# Patient Record
Sex: MALE | Race: BLACK OR AFRICAN AMERICAN | Employment: OTHER | URBAN - METROPOLITAN AREA
[De-identification: names, ages, dates, MRNs, and addresses within clinical notes are randomized per-mention and may not be internally consistent; named-entity substitution may affect disease eponyms.]

---

## 2017-08-02 ENCOUNTER — HOSPITAL ENCOUNTER (OUTPATIENT)
Dept: PHYSICAL THERAPY | Age: 62
Discharge: HOME | End: 2017-08-02
Payer: MEDICARE

## 2017-08-02 PROCEDURE — G8979 MOBILITY GOAL STATUS: HCPCS

## 2017-08-02 PROCEDURE — 97140 MANUAL THERAPY 1/> REGIONS: CPT

## 2017-08-02 PROCEDURE — 97161 PT EVAL LOW COMPLEX 20 MIN: CPT

## 2017-08-02 PROCEDURE — G8978 MOBILITY CURRENT STATUS: HCPCS

## 2017-08-02 PROCEDURE — 97530 THERAPEUTIC ACTIVITIES: CPT

## 2017-08-02 NOTE — PROGRESS NOTES
[facility] [address], [place], [address] [facility]  Phone: [phone]      Fax:  [phone]    Plan of Care/ Statement of Necessity for Physical Therapy Services    Patient name: Jennifer Mcleod Start of Care: 2017   Referral source: Bettie Devlin MD : 1955    Medical Diagnosis: Right knee pain [M25.561]   Onset Date:17    Treatment Diagnosis: right knee pain   Prior Hospitalization: see medical history Provider#: [id number]   Medications: Verified on Patient summary List    Comorbidities: arthritis, back pain, HA, high BP,osteoporesis, sleep dysfunction   Prior Level of Function: indep with ADLs without pain      The Plan of Care and following information is based on the information from the initial evaluation. Assessment/ key information: Pt is a 62yo male s/p right TKR on 17 with c/c right knee pain that limits walking. Pt participated in [address] until 17 with pt reporting getting to 90deg knee flexion. Pt demonstrates a fair quad set with inability to complete full contraction. AROM is decreased with measuring 7-112. Right LE strength is decreased as well with being positive for HS and quad flexibility. Patellar mobility is decreased especially superiorly and inferiorly with mobility increasing slightly after mobs. Due to the above deficits pt has difficulty with complete daily activities such as stairs, walking and squatting.      Evaluation Complexity History HIGH Complexity :3+ comorbidities / personal factors will impact the outcome/ POC ; Examination LOW Complexity : 1-2 Standardized tests and measures addressing body structure, function, activity limitation and / or participation in recreation  ;Presentation LOW Complexity : Stable, uncomplicated  ;Clinical Decision Making MEDIUM Complexity : FOTO score of 26-74  Overall Complexity Rating: LOW     Problem List: pain affecting function, decrease ROM, decrease strength, edema affecting function, impaired gait/ balance, decrease ADL/ functional abilitiies, decrease activity tolerance, decrease flexibility/ joint mobility and decrease transfer abilities   Treatment Plan may include any combination of the following: Therapeutic exercise, Therapeutic activities, Neuromuscular re-education, Physical agent/modality, Gait/balance training, Manual therapy, Patient education, Self Care training, Functional mobility training, Home safety training and Stair training  Patient / Family readiness to learn indicated by: asking questions, trying to perform skills and interest  Persons(s) to be included in education: patient (P)  Barriers to Learning/Limitations: None  Patient Goal (s): Get rid of the pain and walk a lot better. t  Patient Self Reported Health Status: good  Rehabilitation Potential: good    Short Term Goals: STG- To be accomplished in 2 week(s):  1. Pt will be independent with HEP to encourage prophylaxis. Eval: HEP dispensed  Current: NA     Long Term Goals: LTG- To be accomplished in 8 week(s):  1. Pt will demonstrate ability to ambulate with LRAD in the community for >1hour in order to return to prior level of function. Eval: SPC in the community, no AD around home  Current: NA     2.  Pt will increase right knee AROM to 0-120deg in order to participate in functional activities with less pain such as biking. Eval: 7-112  Current: NA     3.  Pt right LE ROM will improve to equal that of left LE with to allow pt to return to ADLs with less pain. Eval:                                    Strength (1-5)      Left Right   Hip Flexion 5/5 4+/5     Extension 5/5 5/5     Abduction 5/5 4-/5 p!     Adduction 5/5 5/5   Knee Flexion 5/5 4/5     Extension 5/5 4/5 p! Current: NA     4. Pt FOTO score will increase by >10 points to show improvement in mobility function.   Eval:43  Current: will address at visit 5       Frequency / Duration: Patient to be seen 2 times per week for 8 weeks. Patient/ Caregiver education and instruction: Diagnosis, prognosis, self care, activity modification and exercises   [x]  Plan of care has been reviewed with PTA    G-Codes (GP)  Mobility   Current  CK= 40-59%   Goal  CK= 40-59%  The severity rating is based on clinical judgment and the FOTO score. Certification Period: 8/2/17-10/1/17   Josi Galan Remesic 8/2/2017 1:55 PM  _____________________________________________________________________  I certify that the above Therapy Services are being furnished while the patient is under my care. I agree with the treatment plan and certify that this therapy is necessary.     Physician's Signature:____________________  Date:__________Time:______    Please sign and return to   [facility] at the [address], [address] [facility]  Phone: [phone]      Fax:  [phone]

## 2017-08-02 NOTE — PROGRESS NOTES
PT DAILY TREATMENT NOTE/KNEE EVAL 3-    Patient Name: Issa Jeffrey  Date:2017  : 1955  [x]  Patient  Verified  Payor: [place] MED[place]ARE / Plan: [place] MEDICARE PART A & B / Product Type: Medicare /    In time:11:56  Out time:12:52pm  Total Treatment Time (min): 56  Total Timed Codes (min): 46  1:1 Treatment Time ( [address] only): 64   Visit #: 1 of 16    Treatment Area: Right knee pain [M25.561]    SUBJECTIVE  Pain Level (0-10 scale): 2/10 achy   [x]constant [x]intermittent [x]improving []worsening []no change since onset    Any medication changes, allergies to medications, adverse drug reactions, diagnosis change, or new procedure performed?: [x] No    [] Yes (see summary sheet for update)  Subjective functional status/changes:     PLOF: Walking with SPC prior to surgery, biking couple miles 2-3days a week, indep ADLs  Limitations to PLOF: Stairs, sleep without pain  Mechanism of Injury:  pt jumped down off landing and heard pop with lots of pain. Continued to run for training and never went to doctor about it. Has been hurting since. Pt had Right knee TKR on 17. Current symptoms/Complaints: Right knee pain with daily activities, unable to walk long distances  Previous Treatment/Compliance: HHPT until  and per pt report reached 90deg flexion  PMHx/Surgical Hx: Injured right knee , lumbar fusion pt unsure of level, arthropspci sx on both knees, bilateral hammer toes  Work Hx: Retired  Living Situation: lives with wife  Pt Goals: \"Get rid of the pain and walk a lot better. \"  Motivation: Good    OBJECTIVE/EXAMINATION    Modality rationale: decrease edema, decrease inflammation, decrease pain, increase tissue extensibility and increase muscle contraction/control to improve the patients ability to complete ADLs with less pain.     Min Type Additional Details    [] Estim:  []Unatt       []IFC  []Premod                        []Other:  []w/ice   []w/heat  Position:  Location:    [] Estim: []Att []TENS instruct  []NMES                    []Other:  []w/US   []w/ice   []w/heat  Position:  Location:    []  Traction: [] Cervical       []Lumbar                       [] Prone          []Supine                       []Intermittent   []Continuous Lbs:  [] before manual  [] after manual    []  Ultrasound: []Continuous   [] Pulsed                           []1MHz   []3MHz Location:  W/cm2:    []  Iontophoresis with dexamethasone         Location: [] Take home patch   [] In clinic    []  Ice     []  heat  []  Ice massage  []  Laser   []  Anodyne Position:  Location:    []  Laser with stim  []  Other: Position:  Location:   10 [x]  Vasopneumatic Device Pressure:       [] lo [x] med [] hi   Temperature: [] lo [x] med [] hi   [x] Skin assessment post-treatment:  [x]intact []redness- no adverse reaction    []redness  adverse reaction:     20 min [x]Eval                  []Re-Eval       18 min Therapeutic Activity:  []  See flow sheet : Pt education on exam findings, POC, HEP compliance, self scar massage. Dispensed HEP. Rationale: increase ROM, increase strength, improve coordination, improve balance and increase proprioception  to improve the patients ability to complete daily activities with less pain. 8 min Manual Therapy:  Grade II-III Patellar mobs, PROM    Rationale: decrease pain, increase ROM, increase tissue extensibility and decrease trigger points to allow pt to participate in ADLs with less pain.            With   [] TE   [x] TA   [] neuro   [] other: Patient Education: [x] Review HEP    [] Progressed/Changed HEP based on:   [] positioning   [] body mechanics   [] transfers   [] heat/ice application    [] other:      Other Objective/Functional Measures: Dispensed HEP, See POC    Physical Therapy Evaluation - Knee    Gait:  [] Normal    [] Abnormal    [] Antalgic    [] NWB    Device: SPC    Describe: WBAT right LE, dec abran, inc right knee flexion in stance  ROM / Strength  [] Unable to assess AROM                      PROM                   Strength (1-5)    Left Right Left Right Left Right   Hip Flexion     5/5 4+/5    Extension     5/5 5/5    Abduction     5/5 4-/5 p! Adduction     5/5 5/5   Knee Flexion 125 112  115 5/5 4/5    Extension 0 -7  -5 5/5 4/5 p! Flexibility: [] Unable to assess at this time  Hamstrings:    (L) Tightness= [] WNL   [] Min   [] Mod   [] Severe    (R) Tightness= [] WNL   [] Min   [x] Mod   [] Severe  Quadriceps:    (L) Tightness= [] WNL   [] Min   [] Mod   [] Severe    (R) Tightness= [] WNL   [] Min   [x] Mod   [] Severe  Gastroc:      (L) Tightness= [] WNL   [] Min   [] Mod   [] Severe    (R) Tightness= [] WNL   [] Min   [x] Mod   [] Severe  Other:    Palpation:   Neg/Pos  Neg/Pos  Neg/Pos   Joint Line  Quad tendon  Patellar ligament    Patella pos Fibular head  Pes Anserinus    Tibial tubercle  Hamstring tendons pos Infrapatellar fat pad      Optional Tests:  Patellar Mobility   []L []R Hypermobile []L [x]R Hypomobile  Sup/Inf     Other tests/comments:   Quad set - fair  Ely's - positive right       Pain Level (0-10 scale) post treatment: 2    ASSESSMENT/Changes in Function: Pt is a 64yo male s/p right TKR on 7/11/17 with c/c right knee pain that limits walking. Pt participated in [address] until 7/28/17 with pt reporting getting to 90deg knee flexion. Pt demonstrates a fair quad set with inability to complete full contraction. AROM is decreased with measuring 7-112. Right LE strength is decreased as well with being positive for HS and quad flexibility. Patellar mobility is decreased especially superiorly and inferiorly with mobility increasing slightly after mobs. Due to the above deficits pt has difficulty with complete daily activities such as stairs, walking and squatting.      Patient will continue to benefit from skilled PT services to modify and progress therapeutic interventions, address functional mobility deficits, address ROM deficits, address strength deficits, analyze and address soft tissue restrictions, analyze and cue movement patterns, analyze and modify body mechanics/ergonomics, assess and modify postural abnormalities, address imbalance/dizziness and instruct in home and community integration to attain remaining goals. []  See Plan of Care  []  See progress note/recertification  []  See Discharge Summary         Progress towards goals / Updated goals:  Short Term Goals: STG- To be accomplished in 2 week(s):  1. Pt will be independent with HEP to encourage prophylaxis. Eval: HEP dispensed  Current: NA    Long Term Goals: LTG- To be accomplished in 8 week(s):  1. Pt will demonstrate ability to ambulate with LRAD in the community for >1hour in order to return to prior level of function. Eval: SPC in the community, no AD around home  Current: NA    2. Pt will increase right knee AROM to 0-120deg in order to participate in functional activities with less pain such as biking. Eval: 7-112  Current: NA    3. Pt right LE ROM will improve to equal that of left LE with to allow pt to return to ADLs with less pain. Eval:                                    Strength (1-5)    Left Right   Hip Flexion 5/5 4+/5    Extension 5/5 5/5    Abduction 5/5 4-/5 p! Adduction 5/5 5/5   Knee Flexion 5/5 4/5    Extension 5/5 4/5 p! Current: NA    4. Pt FOTO score will increase by >10 points to show improvement in mobility function.   Eval:43  Current: will address at visit 5      PLAN  []  Upgrade activities as tolerated     []  Continue plan of care  []  Update interventions per flow sheet       []  Discharge due to:_  []  Other:_      Cuoc Call 8/2/2017  11:55 AM

## 2017-08-07 ENCOUNTER — HOSPITAL ENCOUNTER (OUTPATIENT)
Dept: PHYSICAL THERAPY | Age: 62
Discharge: HOME | End: 2017-08-07
Payer: MEDICARE

## 2017-08-07 PROCEDURE — 97140 MANUAL THERAPY 1/> REGIONS: CPT | Performed by: PHYSICAL THERAPIST

## 2017-08-07 PROCEDURE — 97110 THERAPEUTIC EXERCISES: CPT | Performed by: PHYSICAL THERAPIST

## 2017-08-07 NOTE — PROGRESS NOTES
PT DAILY TREATMENT NOTE - [facility]     Patient Name: Arnel Mckenzie  Date:2017  : 1955  [x]  Patient  Verified  Payor: Herbert Radames / Plan: [place] MEDICARE PART A & B / Product Type: Medicare /    In time:4:30  Out time:5:35  Total Treatment Time (min): 65  Total Timed Codes (min): 53  1:1 Treatment Time ( [address] only): 40  Visit #: 2 of 16    Treatment Area: Right knee pain [M25.561]    SUBJECTIVE  Pain Level (0-10 scale): 1  Any medication changes, allergies to medications, adverse drug reactions, diagnosis change, or new procedure performed?: [x] No    [] Yes (see summary sheet for update)  Subjective functional status/changes:   [] No changes reported  \"I am feeling pretty good today. \"    OBJECTIVE    Modality rationale: decrease edema, decrease inflammation and decrease pain to improve the patients ability to perform daily activities with decreased pain and symptom levels     Min Type Additional Details    [] Estim:  []Unatt       []IFC  []Premod                        []Other:  []w/ice   []w/heat  Position:  Location:    [] Estim: []Att    []TENS instruct  []NMES                    []Other:  []w/US   []w/ice   []w/heat  Position:  Location:    []  Traction: [] Cervical       []Lumbar                       [] Prone          []Supine                       []Intermittent   []Continuous Lbs:  [] before manual  [] after manual    []  Ultrasound: []Continuous   [] Pulsed                           []1MHz   []3MHz W/cm2:  Location:    []  Iontophoresis with dexamethasone         Location: [] Take home patch   [] In clinic    []  Ice     []  heat  []  Ice massage  []  Laser   []  Anodyne Position:  Location:    []  Laser with stim  []  Other:  Position:  Location:   10 [x]  Vasopneumatic Device Pressure:       [] lo [] med [] hi   Temperature: [] lo [] med [] hi   [] Skin assessment post-treatment:  [x]intact [x]redness- no adverse reaction    []redness  adverse reaction:     35 min Therapeutic Exercise:  [x] See flow sheet :   Rationale: increase ROM, increase strength and improve coordination to improve the patients ability to perform daily activities with decreased pain and symptom levels      10 min Manual Therapy:  PROM, joint mobs to right knee   Rationale: decrease pain, increase ROM, increase tissue extensibility and decrease trigger points to perform daily activities with decreased pain and symptom levels              With   [x] TE   [] TA   [] neuro   [] other: Patient Education: [x] Review HEP    [] Progressed/Changed HEP based on:   [] positioning   [] body mechanics   [] transfers   [] heat/ice application    [] other:      Other Objective/Functional Measures: PROM: 3-120     Pain Level (0-10 scale) post treatment: 1/10    ASSESSMENT/Changes in Function: Patient demonstrates weakness in right hip as evidenced by trendelenburg and pelvic drop during step ups. Patient will continue to benefit from skilled PT services to modify and progress therapeutic interventions, address functional mobility deficits, address ROM deficits, address strength deficits, analyze and address soft tissue restrictions, analyze and cue movement patterns, assess and modify postural abnormalities and instruct in home and community integration to attain remaining goals. []  See Plan of Care  []  See progress note/recertification  []  See Discharge Summary         Progress towards goals / Updated goals:  Short Term Goals: STG- To be accomplished in 2 week(s):  1.  Pt will be independent with HEP to encourage prophylaxis. Eval: HEP dispensed  Current: NA      Long Term Goals: LTG- To be accomplished in 8 week(s):  1.  Pt will demonstrate ability to ambulate with LRAD in the community for >1hour in order to return to prior level of function.   Eval: SPC in the community, no AD around home  Current: NA      2.  Pt will increase right knee AROM to 0-120deg in order to participate in functional activities with less pain such as biking. Eval: 7-112  Current: NA      3.  Pt right LE ROM will improve to equal that of left LE with to allow pt to return to ADLs with less pain. Eval:                                    Strength (1-5)        Left Right   Hip Flexion 5/5 4+/5      Extension 5/5 5/5      Abduction 5/5 4-/5 p!      Adduction 5/5 5/5   Knee Flexion 5/5 4/5      Extension 5/5 4/5 p! Current: NA      4.  Pt FOTO score will increase by >10 points to show improvement in mobility function.   Eval:43  Current: will address at visit 5      PLAN  [x]  Upgrade activities as tolerated     [x]  Continue plan of care  []  Update interventions per flow sheet       []  Discharge due to:_  []  Other:_      Layton Olivares PT 8/7/2017  7:16 PM    Future Appointments  Date Time Provider [place]   8/9/2017 4:00 PM [facility] PT LE[facility]VA 2 [facility]   8/14/2017 4:30 PM Layton Olivares PT [facility]   8/16/2017 4:00 PM [facility] PT [facility] 2 [facility]   8/21/2017 11:30 AM [facility] PT [facility] 2 [facility]   8/23/2017 4:00 PM [facility] PT [facility] 2 [facility]   8/28/2017 3:30 PM Layton Olivares PT [facility]   8/30/2017 4:00 PM [facility] PT [facility] 2 [facility]

## 2017-08-09 ENCOUNTER — HOSPITAL ENCOUNTER (OUTPATIENT)
Dept: PHYSICAL THERAPY | Age: 62
Discharge: HOME | End: 2017-08-09
Payer: MEDICARE

## 2017-08-09 PROCEDURE — 97140 MANUAL THERAPY 1/> REGIONS: CPT

## 2017-08-09 PROCEDURE — 97110 THERAPEUTIC EXERCISES: CPT

## 2017-08-09 PROCEDURE — 97016 VASOPNEUMATIC DEVICE THERAPY: CPT

## 2017-08-09 NOTE — PROGRESS NOTES
PT DAILY TREATMENT NOTE - [facility]     Patient Name: Daya Singleton  Date:2017  : 1955  [x]  Patient  Verified  Payor: Kyle Fraction / Plan: [place] MEDICARE PART A & B / Product Type: Medicare /    In time:4:00  Out time:5:08pm  Total Treatment Time (min): 68  Total Timed Codes (min): 58  1:1 Treatment Time ( [address] only): 40   Visit #: 3 of 16    Treatment Area: Right knee pain [M25.561]    SUBJECTIVE  Pain Level (0-10 scale): 3  Any medication changes, allergies to medications, adverse drug reactions, diagnosis change, or new procedure performed?: [x] No    [] Yes (see summary sheet for update)  Subjective functional status/changes:   [] No changes reported  \"My knee is hurting a little more today but not much. \"    OBJECTIVE    Modality rationale: decrease edema, decrease inflammation, decrease pain, increase tissue extensibility and increase muscle contraction/control to improve the patients ability to perform ADLs with decreased pain.     Min Type Additional Details    [] Estim:  []Unatt       []IFC  []Premod                        []Other:  []w/ice   []w/heat  Position:  Location:    [] Estim: []Att    []TENS instruct  []NMES                    []Other:  []w/US   []w/ice   []w/heat  Position:  Location:    []  Traction: [] Cervical       []Lumbar                       [] Prone          []Supine                       []Intermittent   []Continuous Lbs:  [] before manual  [] after manual    []  Ultrasound: []Continuous   [] Pulsed                           []1MHz   []3MHz W/cm2:  Location:    []  Iontophoresis with dexamethasone         Location: [] Take home patch   [] In clinic    []  Ice     []  heat  []  Ice massage  []  Laser   []  Anodyne Position:  Location:    []  Laser with stim  []  Other:  Position:  Location:   10 [x]  Vasopneumatic Device Pressure:       [] lo [x] med [] hi   Temperature: [] lo [x] med [] hi   [x] Skin assessment post-treatment:  [x]intact []redness- no adverse reaction []redness  adverse reaction:       48 min Therapeutic Exercise:  [x] See flow sheet :   Rationale: increase ROM, increase strength, improve coordination, improve balance and increase proprioception to improve the patients ability to perform ADLs with decreased pain. 10 min Manual Therapy:  PROM to right knee, mob into extension/ER, patellar mobs   Rationale: decrease pain, increase ROM and increase tissue extensibility to perform ADLs with decreased pain. With   [] TE   [] TA   [] neuro   [] other: Patient Education: [x] Review HEP    [] Progressed/Changed HEP based on:   [] positioning   [] body mechanics   [] transfers   [] heat/ice application    [] other:      Other Objective/Functional Measures: Increased right knee AROM 2-116*     Pain Level (0-10 scale) post treatment: 3-4    ASSESSMENT/Changes in Function: Quad set is improving to good contraction but fatigues easily. Right knee AROM continues to improve with increased flexion and extension today. Patient will continue to benefit from skilled PT services to modify and progress therapeutic interventions, address functional mobility deficits, address ROM deficits, address strength deficits, analyze and address soft tissue restrictions, analyze and cue movement patterns, analyze and modify body mechanics/ergonomics, address imbalance/dizziness and instruct in home and community integration to attain remaining goals. [x]  See Plan of Care  []  See progress note/recertification  []  See Discharge Summary         Progress towards goals / Updated goals:  Short Term Goals: STG- To be accomplished in 2 week(s):  1.  Pt will be independent with HEP to encourage prophylaxis. Eval: HEP dispensed  Current: Partial compliance per pt report      Long Term Goals: LTG- To be accomplished in 8 week(s):  1.  Pt will demonstrate ability to ambulate with LRAD in the community for >1hour in order to return to prior level of function.   Eval: SPC in the community, no AD around home  Current: NA      2.  Pt will increase right knee AROM to 0-120deg in order to participate in functional activities with less pain such as biking. Eval: 7-112  Current: 2-116*      3.  Pt right LE ROM will improve to equal that of left LE with to allow pt to return to ADLs with less pain. Eval:                                    Strength (1-5)        Left Right   Hip Flexion 5/5 4+/5      Extension 5/5 5/5      Abduction 5/5 4-/5 p!      Adduction 5/5 5/5   Knee Flexion 5/5 4/5      Extension 5/5 4/5 p! Current: NA      4.  Pt FOTO score will increase by >10 points to show improvement in mobility function.   Eval:43  Current: will address at visit 5       PLAN  [x]  Upgrade activities as tolerated     [x]  Continue plan of care  []  Update interventions per flow sheet       []  Discharge due to:_  []  Other:_      Bello [facility] 8/9/2017  4:26 PM    Future Appointments  Date Time Provider [place]   8/14/2017 4:30 PM Sujit Donnelly PT [facility]   8/16/2017 4:00 [facility]   8/21/2017 11:30 AM [facility]   8/23/2017 4:00 PM [facility] PT LEYVA 2 [facility]   8/28/2017 3:30 PM Sujit Donnelly PT [facility]   8/30/2017 4:00 PM [facility] PT LEYVA 2 [facility]

## 2017-08-14 ENCOUNTER — HOSPITAL ENCOUNTER (OUTPATIENT)
Dept: PHYSICAL THERAPY | Age: 62
Discharge: HOME | End: 2017-08-14
Payer: MEDICARE

## 2017-08-14 PROCEDURE — 97140 MANUAL THERAPY 1/> REGIONS: CPT | Performed by: PHYSICAL THERAPIST

## 2017-08-14 PROCEDURE — 97110 THERAPEUTIC EXERCISES: CPT | Performed by: PHYSICAL THERAPIST

## 2017-08-14 PROCEDURE — 97530 THERAPEUTIC ACTIVITIES: CPT | Performed by: PHYSICAL THERAPIST

## 2017-08-14 NOTE — PROGRESS NOTES
PT DAILY TREATMENT NOTE - [facility]     Patient Name: Randy Heart  Date:2017  : 1955  [x]  Patient  Verified  Payor: [place] MED[place]ARE / Plan: [place] MEDICARE PART A & B / Product Type: Medicare /    In time:4:30  Out time:5:38  Total Treatment Time (min): 68  Total Timed Codes (min): 68  1:1 Treatment Time ( [address] only): 68   Visit #: 4  16    Treatment Area: Right knee pain [M25.561]    SUBJECTIVE  Pain Level (0-10 scale): 410  Any medication changes, allergies to medications, adverse drug reactions, diagnosis change, or new procedure performed?: [x] No    [] Yes (see summary sheet for update)  Subjective functional status/changes:   [] No changes reported  \"I am feeling a little stiff today. \"    OBJECTIVE    Modality rationale: decrease inflammation, decrease pain and increase tissue extensibility to improve the patients ability to perform daily activities with decreased pain and symptom levels     Min Type Additional Details    [] Estim:  []Unatt       []IFC  []Premod                        []Other:  []w/ice   []w/heat  Position:  Location:    [] Estim: []Att    []TENS instruct  []NMES                    []Other:  []w/US   []w/ice   []w/heat  Position:  Location:    []  Traction: [] Cervical       []Lumbar                       [] Prone          []Supine                       []Intermittent   []Continuous Lbs:  [] before manual  [] after manual    []  Ultrasound: []Continuous   [] Pulsed                           []1MHz   []3MHz W/cm2:  Location:    []  Iontophoresis with dexamethasone         Location: [] Take home patch   [] In clinic    []  Ice     []  heat  []  Ice massage  []  Laser   []  Anodyne Position:  Location:    []  Laser with stim  []  Other:  Position:  Location:   10 [x]  Vasopneumatic Device Pressure:       [] lo [] med [] hi   Temperature: [] lo [] med [] hi   [] Skin assessment post-treatment:  [x]intact [x]redness- no adverse reaction    []redness  adverse reaction:       34 min Therapeutic Exercise:  [x] See flow sheet :   Rationale: increase ROM, increase strength and improve coordination to improve the patients ability to perform daily activities with decreased pain and symptom levels      14 min Therapeutic Activity:  [x]  See flow sheet :   Rationale: increase ROM, increase strength and improve coordination  to improve the patients ability to perform daily activities with decreased pain and symptom levels     10 min Manual Therapy:  PROM to right knee, tibiofemoral joint mobs, inferior patellar glide in flexion. Rationale: decrease pain, increase ROM, increase tissue extensibility and decrease trigger points to perform daily activities with decreased pain and symptom levels              With   [x] TE   [] TA   [] neuro   [] other: Patient Education: [x] Review HEP    [x] Progressed/Changed HEP based on:   [] positioning   [] body mechanics   [] transfers   [] heat/ice application    [] other:      Other Objective/Functional Measures:   PROM: 0-124  AROM: 0-120     Pain Level (0-10 scale) post treatment: 3/10    ASSESSMENT/Changes in Function: Patient demonstrates excellent ROM s/p right TKR and with good quad control observed. Patient will continue to benefit from skilled PT services to modify and progress therapeutic interventions, address functional mobility deficits, address ROM deficits, address strength deficits, analyze and address soft tissue restrictions, analyze and cue movement patterns and analyze and modify body mechanics/ergonomics to attain remaining goals. []  See Plan of Care  []  See progress note/recertification  []  See Discharge Summary         Progress towards goals / Updated goals:  Short Term Goals: STG- To be accomplished in 2 week(s):  1.  Pt will be independent with HEP to encourage prophylaxis.   Eval: HEP dispensed  Current: Partial compliance per pt report      Long Term Goals: LTG- To be accomplished in 8 week(s):  1.  Pt will demonstrate ability to ambulate with LRAD in the community for >1hour in order to return to prior level of function. Eval: SPC in the community, no AD around home  Current: NA      2.  Pt will increase right knee AROM to 0-120deg in order to participate in functional activities with less pain such as biking. Eval: 7-112  Current: 0-120- MET      3.  Pt right LE ROM will improve to equal that of left LE with to allow pt to return to ADLs with less pain. Eval:                                    Strength (1-5)        Left Right   Hip Flexion 5/5 4+/5      Extension 5/5 5/5      Abduction 5/5 4-/5 p!      Adduction 5/5 5/5   Knee Flexion 5/5 4/5      Extension 5/5 4/5 p! Current: NA      4.  Pt FOTO score will increase by >10 points to show improvement in mobility function.   Eval:43  Current: will address at visit 5      PLAN  []  Upgrade activities as tolerated     []  Continue plan of care  []  Update interventions per flow sheet       []  Discharge due to:_  []  Other:_      Hung Joseph PT 8/14/2017  4:39 PM    Future Appointments  Date Time Provider Filemon Hopkins   8/16/2017 4:00 PM THE [facility] PT LEYVA 2 [facility]   8/21/2017 11:30 AM THE [facility] PT LEYVA 2 [facility]   8/23/2017 4:00 PM THE [facility] PT LEYVA 2 [facility]PTBW [facility]   8/28/2017 3:30 PM ALVA BenoitHPSUSSY THE [facility]   8/30/2017 2:30 PM ALVA Benoit THE [facility]

## 2017-08-16 ENCOUNTER — HOSPITAL ENCOUNTER (OUTPATIENT)
Dept: PHYSICAL THERAPY | Age: 62
Discharge: HOME | End: 2017-08-16
Payer: MEDICARE

## 2017-08-16 PROCEDURE — 97016 VASOPNEUMATIC DEVICE THERAPY: CPT

## 2017-08-16 PROCEDURE — 97140 MANUAL THERAPY 1/> REGIONS: CPT

## 2017-08-16 PROCEDURE — 97110 THERAPEUTIC EXERCISES: CPT

## 2017-08-16 NOTE — PROGRESS NOTES
PT DAILY TREATMENT NOTE - [facility]     Patient Name: Yumiko Landa  Date:2017  : 1955  [x]  Patient  Verified  Payor: [place] MEDICARE / Plan: [place] MEDICARE PART A & B / Product Type: Medicare /    In time:4:23  Out time:5:40  Total Treatment Time (min): 77  Total Timed Codes (min): 67  1:1 Treatment Time ( [address] only): 39   Visit #: 5 of 16    Treatment Area: Right knee pain [M25.561]    SUBJECTIVE  Pain Level (0-10 scale): 3  Any medication changes, allergies to medications, adverse drug reactions, diagnosis change, or new procedure performed?: [x] No    [] Yes (see summary sheet for update)  Subjective functional status/changes:   [] No changes reported  \"Still having to use my cane but I feel like my balance is getting better. \"     OBJECTIVE    Modality rationale: decrease inflammation, decrease pain, increase tissue extensibility and increase muscle contraction/control to improve the patients ability to perform ADLs with decreased pain.     Min Type Additional Details    [] Estim:  []Unatt       []IFC  []Premod                        []Other:  []w/ice   []w/heat  Position:  Location:    [] Estim: []Att    []TENS instruct  []NMES                    []Other:  []w/US   []w/ice   []w/heat  Position:  Location:    []  Traction: [] Cervical       []Lumbar                       [] Prone          []Supine                       []Intermittent   []Continuous Lbs:  [] before manual  [] after manual    []  Ultrasound: []Continuous   [] Pulsed                           []1MHz   []3MHz W/cm2:  Location:    []  Iontophoresis with dexamethasone         Location: [] Take home patch   [] In clinic    []  Ice     []  heat  []  Ice massage  []  Laser   []  Anodyne Position:  Location:    []  Laser with stim  []  Other:  Position:  Location:   10 [x]  Vasopneumatic Device Pressure:       [] lo [x] med [] hi   Temperature: [] lo [x] med [] hi   [x] Skin assessment post-treatment:  [x]intact []redness- no adverse reaction []redness  adverse reaction:       37 min Therapeutic Exercise:  [x] See flow sheet :   Rationale: increase ROM, increase strength and improve coordination to improve the patients ability to perform daily activities with decreased pain. 12 min Therapeutic Activity:  [x]  See flow sheet :   Rationale: increase ROM, increase strength, improve coordination and improve balance  to improve the patients ability to perform daily activities with decreased pain and symptom levels. 8 min Manual Therapy:  PROM to right knee, post tibiofemoral joint mobs, inf patella glide   Rationale: decrease pain, increase ROM and increase tissue extensibility to perform daily activities with decreased pain. With   [] TE   [] TA   [] neuro   [] other: Patient Education: [x] Review HEP    [] Progressed/Changed HEP based on:   [] positioning   [] body mechanics   [] transfers   [] heat/ice application    [] other:      Other Objective/Functional Measures: Improved patella mobility and quad set, good almost strong quad set     Pain Level (0-10 scale) post treatment: 3    ASSESSMENT/Changes in Function: Pt right knee AROM is improving with reaching 123* today. Continues to demonstrate glut fatigue with 3 way hip. Patient will continue to benefit from skilled PT services to modify and progress therapeutic interventions, address functional mobility deficits, address ROM deficits, address strength deficits, analyze and cue movement patterns, address imbalance/dizziness and instruct in home and community integration to attain remaining goals. []  See Plan of Care  []  See progress note/recertification  []  See Discharge Summary         Progress towards goals / Updated goals:  Short Term Goals: STG- To be accomplished in 2 week(s):  1.  Pt will be independent with HEP to encourage prophylaxis.   Eval: HEP dispensed  Current: Partial compliance per pt report      Long Term Goals: LTG- To be accomplished in 8 week(s):  1.  Pt will demonstrate ability to ambulate with LRAD in the community for >1hour in order to return to prior level of function. Eval: SPC in the community, no AD around home  Current: Still needing to use SPC in community however reports balance seems to be getting better - progressing      2.  Pt will increase right knee AROM to 0-120deg in order to participate in functional activities with less pain such as biking. Eval: 7-112  Current: 0-123- MET      3.  Pt right LE ROM will improve to equal that of left LE with to allow pt to return to ADLs with less pain. Eval:                                    Strength (1-5)        Left Right   Hip Flexion 5/5 4+/5      Extension 5/5 5/5      Abduction 5/5 4-/5 p!      Adduction 5/5 5/5   Knee Flexion 5/5 4/5      Extension 5/5 4/5 p! Current: NA      4.  Pt FOTO score will increase by >10 points to show improvement in mobility function.   Eval:43  Current: will evaluate next visit          PLAN  [x]  Upgrade activities as tolerated     [x]  Continue plan of care  []  Update interventions per flow sheet       []  Discharge due to:_  []  Other:_      Blair Trevino 8/16/2017  4:26 PM    Future Appointments  Date Time Provider Filemon Hopkins   8/21/2017 11:30 AM [facility] PT LEYVA 2 [facility]   8/23/2017 4:00 PM [facility] PT LEYVA 2 [facility]   8/28/2017 3:30 PM Suhas Garcia PT [facility]   8/30/2017 2:30 PM Suhas Garcia PT [facility]

## 2017-08-21 ENCOUNTER — APPOINTMENT (OUTPATIENT)
Dept: PHYSICAL THERAPY | Age: 62
End: 2017-08-21
Payer: MEDICARE

## 2017-08-23 ENCOUNTER — APPOINTMENT (OUTPATIENT)
Dept: PHYSICAL THERAPY | Age: 62
End: 2017-08-23
Payer: MEDICARE

## 2017-08-23 ENCOUNTER — HOSPITAL ENCOUNTER (OUTPATIENT)
Dept: PHYSICAL THERAPY | Age: 62
Discharge: HOME | End: 2017-08-23
Payer: MEDICARE

## 2017-08-23 PROCEDURE — 97110 THERAPEUTIC EXERCISES: CPT

## 2017-08-23 PROCEDURE — 97140 MANUAL THERAPY 1/> REGIONS: CPT

## 2017-08-23 PROCEDURE — 97530 THERAPEUTIC ACTIVITIES: CPT

## 2017-08-23 NOTE — PROGRESS NOTES
PT DAILY TREATMENT NOTE - [facility]     Patient Name: Randy Heart  Date:2017  : 1955  [x]  Patient  Verified  Payor: Brooke [employer] / Plan: [place] MEDICARE PART A & B / Product Type: Medicare /    In time:3:00  Out time:4:20  Total Treatment Time (min): 80  Total Timed Codes (min): 70  1:1 Treatment Time ( [address] only): 39   Visit #: 6 of 16    Treatment Area: Right knee pain [M25.561]    SUBJECTIVE  Pain Level (0-10 scale): 3  Any medication changes, allergies to medications, adverse drug reactions, diagnosis change, or new procedure performed?: [x] No    [] Yes (see summary sheet for update)  Subjective functional status/changes:   [] No changes reported  [facility] doctor said I am doing good. My knee is still painful though. \"    OBJECTIVE    Modality rationale: decrease inflammation, decrease pain and increase muscle contraction/control to improve the patients ability to perform daily activities with decreased pain.     Min Type Additional Details    [] Estim:  []Unatt       []IFC  []Premod                        []Other:  []w/ice   []w/heat  Position:  Location:    [] Estim: []Att    []TENS instruct  []NMES                    []Other:  []w/US   []w/ice   []w/heat  Position:  Location:    []  Traction: [] Cervical       []Lumbar                       [] Prone          []Supine                       []Intermittent   []Continuous Lbs:  [] before manual  [] after manual    []  Ultrasound: []Continuous   [] Pulsed                           []1MHz   []3MHz W/cm2:  Location:    []  Iontophoresis with dexamethasone         Location: [] Take home patch   [] In clinic    []  Ice     []  heat  []  Ice massage  []  Laser   []  Anodyne Position:  Location:    []  Laser with stim  []  Other:  Position:  Location:   10 [x]  Vasopneumatic Device Pressure:       [] lo [x] med [] hi   Temperature: [] lo [x] med [] hi   [x] Skin assessment post-treatment:  []intact []redness- no adverse reaction    []redness  adverse reaction: 58 min Therapeutic Exercise:  [x] See flow sheet :   Rationale: increase ROM, increase strength and improve coordination to improve the patients ability to perform daily activities with decreased pain. 12 min Therapeutic Activity:  [x]  See flow sheet :   Rationale: increase ROM, increase strength, improve balance and increase proprioception  to improve the patients ability to perform daily activities with decreased pain and symptom levels. 10 min Manual Therapy:  PROM right knee, STM to lateral HS   Rationale: decrease pain, increase ROM, increase tissue extensibility and decrease trigger points to perform daily activities with decreased pain. With   [] TE   [] TA   [] neuro   [] other: Patient Education: [x] Review HEP    [] Progressed/Changed HEP based on:   [] positioning   [] body mechanics   [] transfers   [] heat/ice application    [] other:      Other Objective/Functional Measures: Continues to demonstrate fair quad set, tightness in lateral HS noted     Pain Level (0-10 scale) post treatment: 3    ASSESSMENT/Changes in Function: Pt continues to demonstrate fair to good quad set due to quad inhibition from swelling. AROM is improving with reaching 120* continuously. Glut fatigue noted with 3 way hip and clamshells. Added hurdles today to improve balance. Patient will continue to benefit from skilled PT services to modify and progress therapeutic interventions, address functional mobility deficits, address strength deficits, analyze and address soft tissue restrictions, analyze and cue movement patterns, address imbalance/dizziness and instruct in home and community integration to attain remaining goals. []  See Plan of Care  []  See progress note/recertification  []  See Discharge Summary         Progress towards goals / Updated goals:  Short Term Goals: STG- To be accomplished in 2 week(s):  1.  Pt will be independent with HEP to encourage prophylaxis.   Eval: HEP dispensed  Current: Partial compliance per pt report      Long Term Goals: LTG- To be accomplished in 8 week(s):  1.  Pt will demonstrate ability to ambulate with LRAD in the community for >1hour in order to return to prior level of function. Eval: SPC in the community, no AD around home  Current: Still needing to use SPC in community however reports balance seems to be getting better - progressing      2.  Pt will increase right knee AROM to 0-120deg in order to participate in functional activities with less pain such as biking. Eval: 7-112  Current: 0-123- MET      3.  Pt right LE ROM will improve to equal that of left LE with to allow pt to return to ADLs with less pain. Eval:                                    Strength (1-5)        Left Right   Hip Flexion 5/5 4+/5      Extension 5/5 5/5      Abduction 5/5 4-/5 p!      Adduction 5/5 5/5   Knee Flexion 5/5 4/5      Extension 5/5 4/5 p! Current: NA      4.  Pt FOTO score will increase by >10 points to show improvement in mobility function.   Eval:43  Current: 40 - progressing        PLAN  [x]  Upgrade activities as tolerated     [x]  Continue plan of care  []  Update interventions per flow sheet       []  Discharge due to:_  []  Other:_      Arlet Michelle 8/23/2017  3:03 PM    Future Appointments  Date Time Provider Filemon Hopkins   8/23/2017 4:00 PM THE [facility] PT LEYVA 2 [facility]PTBW [facility]   8/28/2017 3:30 PM ALVA MccarthyHPSUSSY [facility]   8/30/2017 2:30 PM Garcia Bush PT [facility]

## 2017-08-28 ENCOUNTER — HOSPITAL ENCOUNTER (OUTPATIENT)
Dept: PHYSICAL THERAPY | Age: 62
Discharge: HOME | End: 2017-08-28
Payer: MEDICARE

## 2017-08-28 PROCEDURE — 97110 THERAPEUTIC EXERCISES: CPT | Performed by: PHYSICAL THERAPIST

## 2017-08-28 PROCEDURE — 97140 MANUAL THERAPY 1/> REGIONS: CPT | Performed by: PHYSICAL THERAPIST

## 2017-08-28 PROCEDURE — 97530 THERAPEUTIC ACTIVITIES: CPT | Performed by: PHYSICAL THERAPIST

## 2017-08-28 NOTE — PROGRESS NOTES
PT DAILY TREATMENT NOTE - [facility]      Patient Name: Vidhi Pineda  Date:2017  : 1955  [x]  Patient  Verified  Payor: Iris [place] / Plan: [place] MEDICARE PART A & B / Product Type: Medicare /    In time:3:30  Out time:4:35  Total Treatment Time (min): 65  Total Timed Codes (min): 55  1:1 Treatment Time ( [address] only): 55  Visit #: 7 of 16     Treatment Area: Right knee pain [M25.561]     SUBJECTIVE  Pain Level (0-10 scale): 2  Any medication changes, allergies to medications, adverse drug reactions, diagnosis change, or new procedure performed?: [x] No    [] Yes (see summary sheet for update)  Subjective functional status/changes:   [] No changes reported  \"Just a little bit stiff today; overall doing fine. \"     OBJECTIVE           Modality rationale: decrease inflammation, decrease pain and increase muscle contraction/control to improve the patients ability to perform daily activities with decreased pain.     Min Type Additional Details     [] Estim:  []Unatt       []IFC  []Premod                        []Other:  []w/ice   []w/heat  Position:  Location:     [] Estim: []Att    []TENS instruct  []NMES                    []Other:  []w/US   []w/ice   []w/heat  Position:  Location:     []  Traction: [] Cervical       []Lumbar                       [] Prone          []Supine                       []Intermittent   []Continuous Lbs:  [] before manual  [] after manual     []  Ultrasound: []Continuous   [] Pulsed                           []1MHz   []3MHz W/cm2:  Location:     []  Iontophoresis with dexamethasone         Location: [] Take home patch   [] In clinic     []  Ice     []  heat  []  Ice massage  []  Laser   []  Anodyne Position:  Location:     []  Laser with stim  []  Other:  Position:  Location:   10 [x]  Vasopneumatic Device Pressure:       [] lo [x] med [] hi   Temperature: [] lo [x] med [] hi   [x] Skin assessment post-treatment:  []intact []redness- no adverse reaction    []redness  adverse reaction:         36 min Therapeutic Exercise:  [x] See flow sheet :   Rationale: increase ROM, increase strength and improve coordination to improve the patients ability to perform daily activities with decreased pain.     10 min Therapeutic Activity:  [x]  See flow sheet :   Rationale: increase ROM, increase strength, improve balance and increase proprioception  to improve the patients ability to perform daily activities with decreased pain and symptom levels.    19 min Manual Therapy:  PROM right knee, STM to lateral HS   Rationale: decrease pain, increase ROM, increase tissue extensibility and decrease trigger points to perform daily activities with decreased pain.       With   [] TE   [] TA   [] neuro   [] other: Patient Education: [x] Review HEP    [] Progressed/Changed HEP based on:   [] positioning   [] body mechanics   [] transfers   [] heat/ice application    [] other:       Other Objective/Functional Measures: Improvement noted in quad set.                Pain Level (0-10 scale) post treatment: 3     ASSESSMENT/Changes in Function: Knee flexion improved following quad stretching.      Patient will continue to benefit from skilled PT services to modify and progress therapeutic interventions, address functional mobility deficits, address strength deficits, analyze and address soft tissue restrictions, analyze and cue movement patterns, address imbalance/dizziness and instruct in home and community integration to attain remaining goals.      []  See Plan of Care  []  See progress note/recertification  []  See Discharge Summary      Progress towards goals / Updated goals:  Short Term Goals: STG- To be accomplished in 2 week(s):  1.  Pt will be independent with HEP to encourage prophylaxis.   Eval: HEP dispensed  Current: Partial compliance per pt report      Long Term Goals: LTG- To be accomplished in 8 week(s):  1.  Pt will demonstrate ability to ambulate with LRAD in the community for >1hour in order to return to prior level of function. Eval: SPC in the community, no AD around home  Current: Still needing to use SPC in community however reports balance seems to be getting better - progressing      2.  Pt will increase right knee AROM to 0-120deg in order to participate in functional activities with less pain such as biking. Eval: 7-112  Current: 0-123- MET      3.  Pt right LE ROM will improve to equal that of left LE with to allow pt to return to ADLs with less pain. Eval:                                    Strength (1-5)        Left Right   Hip Flexion 5/5 4+/5      Extension 5/5 5/5      Abduction 5/5 4-/5 p!      Adduction 5/5 5/5   Knee Flexion 5/5 4/5      Extension 5/5 4/5 p! Current: NA      4.  Pt FOTO score will increase by >10 points to show improvement in mobility function.   Eval:43  Current: 40 - progressing          PLAN  [x]  Upgrade activities as tolerated     [x]  Continue plan of care  []  Update interventions per flow sheet       []  Discharge due to:_  []  Other:_       Ajit Quintero                8/28/2017                                        7:13 pm

## 2017-08-30 ENCOUNTER — HOSPITAL ENCOUNTER (OUTPATIENT)
Dept: PHYSICAL THERAPY | Age: 62
Discharge: HOME | End: 2017-08-30
Payer: MEDICARE

## 2017-08-30 PROCEDURE — 97110 THERAPEUTIC EXERCISES: CPT | Performed by: PHYSICAL THERAPIST

## 2017-08-30 PROCEDURE — 97530 THERAPEUTIC ACTIVITIES: CPT | Performed by: PHYSICAL THERAPIST

## 2017-08-30 NOTE — PROGRESS NOTES
PT DAILY TREATMENT NOTE - [facility]     Patient Name: Shahab Cook  Date:2017  : 1955  [x]  Patient  Verified  Payor: Magdiel Font / Plan: [place] & B / Product Type: Medicare /    In time:2:30  Out time:3:40  Total Treatment Time (min): 70  Total Timed Codes (min): 70  1:1 Treatment Time ( [address] only): 48   Visit #: 8 of 16    Treatment Area: Right knee pain [M25.561]    SUBJECTIVE  Pain Level (0-10 scale): 3/10  Any medication changes, allergies to medications, adverse drug reactions, diagnosis change, or new procedure performed?: [x] No    [] Yes (see summary sheet for update)  Subjective functional status/changes:   [] No changes reported  \"I am doing fine today; no real complaints\"    OBJECTIVE    Modality rationale: decrease edema and decrease inflammation to improve the patients ability to perform daily activities with decreased pain and symptom levels     Min Type Additional Details    [] Estim:  []Unatt       []IFC  []Premod                        []Other:  []w/ice   []w/heat  Position:  Location:    [] Estim: []Att    []TENS instruct  []NMES                    []Other:  []w/US   []w/ice   []w/heat  Position:  Location:    []  Traction: [] Cervical       []Lumbar                       [] Prone          []Supine                       []Intermittent   []Continuous Lbs:  [] before manual  [] after manual    []  Ultrasound: []Continuous   [] Pulsed                           []1MHz   []3MHz W/cm2:  Location:    []  Iontophoresis with dexamethasone         Location: [] Take home patch   [] In clinic    []  Ice     []  heat  []  Ice massage  []  Laser   []  Anodyne Position:  Location:    []  Laser with stim  []  Other:  Position:  Location:   10 [x]  Vasopneumatic Device Pressure:       [] lo [] med [] hi   Temperature: [] lo [] med [] hi   [] Skin assessment post-treatment:  []intact []redness- no adverse reaction    []redness  adverse reaction:     35 min Therapeutic Exercise:  [x] See flow sheet :   Rationale: increase ROM, increase strength and improve coordination to improve the patients ability to perform daily activities with decreased pain and symptom levels       28 min Therapeutic Activity:  [x]  See flow sheet :   Rationale: increase ROM, increase strength and improve coordination  to improve the patients ability to perform daily activities with decreased pain and symptom levels          With   [x] TE   [] TA   [] neuro   [] other: Patient Education: [x] Review HEP    [x] Progressed/Changed HEP based on:   [] positioning   [] body mechanics   [] transfers   [] heat/ice application    [] other:      Other Objective/Functional Measures: AROM without manual therapy: 0-120     Pain Level (0-10 scale) post treatment: 1/10    ASSESSMENT/Changes in Function: patient able to achieve 120 degree flexion AROM without assistance from therapist. Able to complete leg press with only minimal pain, required VC's to drive up with heels. Patient will continue to benefit from skilled PT services to modify and progress therapeutic interventions, address functional mobility deficits, address ROM deficits, address strength deficits, analyze and address soft tissue restrictions, analyze and cue movement patterns and analyze and modify body mechanics/ergonomics to attain remaining goals. []  See Plan of Care  []  See progress note/recertification  []  See Discharge Summary         Progress towards goals / Updated goals:  Short Term Goals: STG- To be accomplished in 2 week(s):  1.  Pt will be independent with HEP to encourage prophylaxis. Eval: HEP dispensed  Current: Partial compliance per pt report      Long Term Goals: LTG- To be accomplished in 8 week(s):  1.  Pt will demonstrate ability to ambulate with LRAD in the community for >1hour in order to return to prior level of function.   Eval: SPC in the community, no AD around home  Current: Still needing to use SPC in community however reports balance seems to be getting better - progressing      2.  Pt will increase right knee AROM to 0-120deg in order to participate in functional activities with less pain such as biking. Eval: 7-112  Current: 0-123- MET      3.  Pt right LE ROM will improve to equal that of left LE with to allow pt to return to ADLs with less pain. Eval:                                    Strength (1-5)        Left Right   Hip Flexion 5/5 4+/5      Extension 5/5 5/5      Abduction 5/5 4-/5 p!      Adduction 5/5 5/5   Knee Flexion 5/5 4/5      Extension 5/5 4/5 p! Current: NA      4.  Pt FOTO score will increase by >10 points to show improvement in mobility function. Eval:43  Current: 40 - progressing       PLAN  [x]  Upgrade activities as tolerated     [x]  Continue plan of care  []  Update interventions per flow sheet       []  Discharge due to:_  []  Other:_      Jamal Valadez, PT 8/30/2017  4:57 PM    No future appointments.

## 2017-09-08 ENCOUNTER — HOSPITAL ENCOUNTER (OUTPATIENT)
Dept: PHYSICAL THERAPY | Age: 62
Discharge: HOME | End: 2017-09-08
Payer: MEDICARE

## 2017-09-08 PROCEDURE — G8979 MOBILITY GOAL STATUS: HCPCS | Performed by: PHYSICAL THERAPIST

## 2017-09-08 PROCEDURE — 97110 THERAPEUTIC EXERCISES: CPT | Performed by: PHYSICAL THERAPIST

## 2017-09-08 PROCEDURE — G8978 MOBILITY CURRENT STATUS: HCPCS | Performed by: PHYSICAL THERAPIST

## 2017-09-08 NOTE — PROGRESS NOTES
[facility] at the [address], [place], [address] [facility]  Phone: [phone]      Fax:  [phone]        Medicare Progress Report    Patient name: Louisa Max Start of Care: 2017   Referral source: Nivia Hutchison MD : 1955                         Medical Diagnosis: Right knee pain [M25.561] Onset Date:17                         Treatment Diagnosis: right knee pain   Prior Hospitalization: see medical history Provider#: [id number]   Medications: Verified on Patient summary List    Comorbidities: arthritis, back pain, HA, high BP,osteoporesis, sleep dysfunction   Prior Level of Function: indep with ADLs without pain    Visits from Start of Care: 9    Missed Visits: 0    Reporting Period: 17 to 17    Subjective Reports: \"Overall I am using my cane less, I have less pain and I can tell I am getting stronger; the therapy is helping. \"    Key functional changes:   Short Term Goals: STG- To be accomplished in 2 week(s):  1.  Pt will be independent with HEP to encourage prophylaxis. Eval: HEP dispensed  Current: NA      Long Term Goals: LTG- To be accomplished in 8 week(s):  1.  Pt will demonstrate ability to ambulate with LRAD in the community for >1hour in order to return to prior level of function. Eval: SPC in the community, no AD around home  Current: NA      2.  Pt will increase right knee AROM to 0-120deg in order to participate in functional activities with less pain such as biking. Eval: 7-112  Current: NA      3.  Pt right LE ROM will improve to equal that of left LE with to allow pt to return to ADLs with less pain. Eval:                                    Strength (1-5)        Left Right   Hip Flexion 5/5 4+/5      Extension 5/5 5/5      Abduction 5/5 4-/5 p!      Adduction 5/5 5/5   Knee Flexion 5/5 4/5      Extension 5/5 4/5 p!    Current: NA      4.  Pt FOTO score will increase by >10 points to show improvement in mobility function. Eval:43  Current: will address at visit 5        Problems/ barriers to goal attainment: Patient is progressing towards goals as expected. Assessment / Recommendations:Patient is a 58 y/o male who presented to outpatient PT s/p right TKR on 7/11/17 and overall is progressing well. Still using cane occasionally for balance impairments and still with pain limiting endurance, transfers and gait. Patient has attended 9 PT sessions to address ROM and strength deficits and will continue to benefit from skilled PT services to modify and progress therapeutic interventions, address functional mobility deficits, address ROM deficits, address strength deficits, analyze and address soft tissue restrictions, analyze and cue movement patterns and analyze and modify body mechanics/ergonomics to attain remaining goals    Problem List: pain affecting function, decrease ROM, decrease strength, impaired gait/ balance, decrease ADL/ functional abilitiies, decrease activity tolerance and decrease flexibility/ joint mobility   Treatment Plan: Therapeutic exercise, Therapeutic activities, Neuromuscular re-education, Physical agent/modality, Gait/balance training, Manual therapy, Patient education and Self Care training    Patient Goal (s) has been updated and includes: see above     Updated Goals to be accomplished in 4 weeks:  Continue progressing towards goals above    Frequency / Duration: Patient to be seen 2-3 times per week for 4 weeks:    G-Codes (GP)  Mobility  [id number] Current  CK= 40-59%   Goal  CK= 40-59%    The severity rating is based on clinical judgement and the FOTO score.       Mariaelena Cuadra, PT 9/8/2017 3:24 PM

## 2017-09-08 NOTE — PROGRESS NOTES
PT DAILY TREATMENT NOTE - [facility]     Patient Name: Amber Cross  Date:2017  : 1955  [x]  Patient  Verified  Payor: Tushar Avalos / Plan: [place] MEDICARE PART A & B / Product Type: Medicare /    In time:10:30  Out time:11:36  Total Treatment Time (min): 66  Total Timed Codes (min): 66  1:1 Treatment Time ( [address] only): 40   Visit #: 9 of 16    Treatment Area: Right knee pain [M25.561]    SUBJECTIVE  Pain Level (0-10 scale): 2/10  Any medication changes, allergies to medications, adverse drug reactions, diagnosis change, or new procedure performed?: [x] No    [] Yes (see summary sheet for update)  Subjective functional status/changes:   [] No changes reported  \"I still have to use my cane occasionally due to pain and imbalance but overall it's getting better; I still want to be able to walk around without using the cane. \"    OBJECTIVE    Modality rationale: decrease edema, decrease inflammation and decrease pain to improve the patients ability to .jonahufn     Min Type Additional Details    [] Estim:  []Unatt       []IFC  []Premod                        []Other:  []w/ice   []w/heat  Position:  Location:    [] Estim: []Att    []TENS instruct  []NMES                    []Other:  []w/US   []w/ice   []w/heat  Position:  Location:    []  Traction: [] Cervical       []Lumbar                       [] Prone          []Supine                       []Intermittent   []Continuous Lbs:  [] before manual  [] after manual    []  Ultrasound: []Continuous   [] Pulsed                           []1MHz   []3MHz W/cm2:  Location:    []  Iontophoresis with dexamethasone         Location: [] Take home patch   [] In clinic    []  Ice     []  heat  []  Ice massage  []  Laser   []  Anodyne Position:  Location:    []  Laser with stim  []  Other:  Position:  Location:   10 [x]  Vasopneumatic Device Pressure:       [] lo [] med [] hi   Temperature: [] lo [] med [] hi   [] Skin assessment post-treatment:  []intact []redness- no adverse reaction    []redness  adverse reaction:     40 min Therapeutic Exercise:  [x] See flow sheet :   Rationale: increase ROM and increase strength to improve the patients ability to perform daily activities with decreased pain and symptom levels            With   [] TE   [] TA   [] neuro   [] other: Patient Education: [x] Review HEP    [] Progressed/Changed HEP based on:   [] positioning   [] body mechanics   [] transfers   [] heat/ice application    [] other:      Other Objective/Functional Measures:   -AROM- 2-124 degrees  -PROM -0 degrees     Pain Level (0-10 scale) post treatment: 0/10    ASSESSMENT/Changes in Function: Patient is a 58 y/o male who presented to outpatient PT s/p right TKR on 7/11/17 and overall is progressing well. Still using cane occasionally for balance impairments and still with pain limiting endurance, transfers and gait. Patient has attended 9 PT sessions to address ROM and strength deficits and will continue to benefit from skilled PT services to modify and progress therapeutic interventions, address functional mobility deficits, address ROM deficits, address strength deficits, analyze and address soft tissue restrictions, analyze and cue movement patterns and analyze and modify body mechanics/ergonomics to attain remaining goals. []  See Plan of Care  [x]  See progress note/recertification  []  See Discharge Summary         Progress towards goals / Updated goals:  Short Term Goals: STG- To be accomplished in 2 week(s):  1.  Pt will be independent with HEP to encourage prophylaxis. Eval: HEP dispensed  Current: Partial compliance per pt report       Long Term Goals: LTG- To be accomplished in 8 week(s):  1.  Pt will demonstrate ability to ambulate with LRAD in the community for >1hour in order to return to prior level of function.   Eval: SPC in the community, no AD around home   Current: Still needing to use SPC in community however reports balance seems to be getting better - progressing      2.  Pt will increase right knee AROM to 0-120deg in order to participate in functional activities with less pain such as biking. Eval: 7-112  Current: 2-124- Progresing      3.  Pt right LE ROM will improve to equal that of left LE with to allow pt to return to ADLs with less pain. Eval:                                    Strength (1-5)        Left Right   Hip Flexion 5/5 4+/5      Extension 5/5 5/5      Abduction 5/5 4-/5 p!      Adduction 5/5 5/5   Knee Flexion 5/5 4/5      Extension 5/5 4/5 p! Current:  PROGRESSING             Strength (1-5)        Left Right   Hip Flexion 5/5 4+/5      Extension 5/5 5/5      Abduction 5/5 4/5      Adduction 5/5 5/5   Knee Flexion 5/5 4/5      Extension 5/5 4/5          4.  Pt FOTO score will increase by >10 points to show improvement in mobility function.   Eval:43  Current: 40 - PROGRESSING      PLAN  [x]  Upgrade activities as tolerated     [x]  Continue plan of care  []  Update interventions per flow sheet       []  Discharge due to:_  []  Other:_      Layton Olivares PT 9/8/2017  10:38 AM    Future Appointments  Date Time Provider [place]   9/12/2017 12:30 PM Radha VAZQUEZ [facility]   9/14/2017 9:00 AM ALVA Michael [facility]   9/19/2017 10:30 AM Radha VAZQUEZ [facility]   9/21/2017 9:00 AM ALVA Michael [facility]   9/26/2017 10:30 AM Radha VAZQUEZ [facility]   9/28/2017 9:30 AM ALVA Michael [facility]

## 2017-09-12 ENCOUNTER — HOSPITAL ENCOUNTER (OUTPATIENT)
Dept: PHYSICAL THERAPY | Age: 62
Discharge: HOME | End: 2017-09-12
Payer: MEDICARE

## 2017-09-12 PROCEDURE — 97110 THERAPEUTIC EXERCISES: CPT | Performed by: PHYSICAL THERAPIST

## 2017-09-12 PROCEDURE — 97530 THERAPEUTIC ACTIVITIES: CPT | Performed by: PHYSICAL THERAPIST

## 2017-09-12 NOTE — PROGRESS NOTES
PT DAILY TREATMENT NOTE - [facility]     Patient Name: Ashley Roberts  Date:2017  : 1955   [x]  Patient  Verified  Payor: [place] MEDICARE / Plan: [place] MEDICARE PART A & B / Product Type: Medicare /    In time:12:30  Out time:1:41  Total Treatment Time (min): 71  Total Timed Codes (min): 71  1:1 Treatment Time ( [address] only): 39   Visit #: 9 of 12    Treatment Area: Right knee pain [M25.561]    SUBJECTIVE  Pain Level (0-10 scale): 310  Any medication changes, allergies to medications, adverse drug reactions, diagnosis change, or new procedure performed?: [x] No    [] Yes (see summary sheet for update)  Subjective functional status/changes:   [] No changes reported  \"I only use th cane occasionally secondary to imbalance but this is improving. \"    OBJECTIVE    Modality rationale: decrease edema, decrease inflammation and decrease pain to improve the patients ability to perform daily activities with decreased pain and symptom levels     Min Type Additional Details    [] Estim:  []Unatt       []IFC  []Premod                        []Other:  []w/ice   []w/heat  Position:  Location:    [] Estim: []Att    []TENS instruct  []NMES                    []Other:  []w/US   []w/ice   []w/heat  Position:  Location:    []  Traction: [] Cervical       []Lumbar                       [] Prone          []Supine                       []Intermittent   []Continuous Lbs:  [] before manual  [] after manual    []  Ultrasound: []Continuous   [] Pulsed                           []1MHz   []3MHz W/cm2:  Location:    []  Iontophoresis with dexamethasone         Location: [] Take home patch   [] In clinic    []  Ice     []  heat  []  Ice massage  []  Laser   []  Anodyne Position:  Location:    []  Laser with stim  []  Other:  Position:  Location:   10 [x]  Vasopneumatic Device Pressure:       [] lo [] med [] hi   Temperature: [] lo [] med [] hi   [] Skin assessment post-treatment:  []intact []redness- no adverse reaction    []redness  adverse reaction:     30 min Therapeutic Exercise:  [x] See flow sheet :   Rationale: increase ROM, increase strength and improve coordination to improve the patients ability to perform daily activities with decreased pain and symptom levels      15 min Therapeutic Activity:  [x]  See flow sheet :   Rationale: increase ROM and increase strength  to improve the patients ability to perform daily activities with decreased pain and symptom levels          With   [] TE   [] TA   [] neuro   [] other: Patient Education: [x] Review HEP    [x] Progressed/Changed HEP based on:   [] positioning   [] body mechanics   [] transfers   [] heat/ice application    [] other:      Other Objective/Functional Measures: Patient demonstrates good knee flexion and terminal knee extension during gait. Pain Level (0-10 scale) post treatment: 1/10    ASSESSMENT/Changes in Function: Patient continues to tolerate all exercises with no reports of pain. Patient will continue to benefit from skilled PT services to modify and progress therapeutic interventions, address functional mobility deficits, address ROM deficits, address strength deficits, analyze and address soft tissue restrictions, analyze and cue movement patterns and analyze and modify body mechanics/ergonomics to attain remaining goals. []  See Plan of Care  []  See progress note/recertification  []  See Discharge Summary         Progress towards goals / Updated goals:  Short Term Goals: STG- To be accomplished in 2 week(s):  1.  Pt will be independent with HEP to encourage prophylaxis. Eval: HEP dispensed  Last PN Partial compliance per pt report  Current:      Long Term Goals: LTG- To be accomplished in 8 week(s):  1.  Pt will demonstrate ability to ambulate with LRAD in the community for >1hour in order to return to prior level of function.   Eval: SPC in the community, no AD around home   Last PN: Still needing to use SPC in community however reports balance seems to be getting better - progressing  Current:      2.  Pt will increase right knee AROM to 0-120deg in order to participate in functional activities with less pain such as biking. Eval: 7-112  Last PN: 2-124- Progresing  Current:      3.  Pt right LE ROM will improve to equal that of left LE with to allow pt to return to ADLs with less pain. Eval:                                    Strength (1-5)        Left Right   Hip Flexion 5/5 4+/5      Extension 5/5 5/5      Abduction 5/5 4-/5 p!      Adduction 5/5 5/5   Knee Flexion 5/5 4/5      Extension 5/5 4/5 p!      Last PN:  PROGRESSING             Strength (1-5)        Left Right   Hip Flexion 5/5 4+/5      Extension 5/5 5/5      Abduction 5/5 4/5      Adduction 5/5 5/5   Knee Flexion 5/5 4/5      Extension 5/5 4/5     Current:      4.  Pt FOTO score will increase by >10 points to show improvement in mobility function.   Eval:43  Last PN: 40 - PROGRESSING  Current:        PLAN  [x]  Upgrade activities as tolerated     [x]  Continue plan of care  []  Update interventions per flow sheet       []  Discharge due to:_  []  Other:_      Kareen Bolton PT 9/12/2017  2:24 PM    Future Appointments  Date Time Provider [place]   9/14/2017 9:00 AM ALVA Moore [facility]   9/19/2017 10:30 AM Dejan VAZQUEZ [facility]   9/21/2017 9:00 AM ALVA Moore [facility]   9/26/2017 10:30 AM Dejan VAZQUEZ [facility]   9/28/2017 9:30 AM ALVA Moore [facility]

## 2017-09-14 ENCOUNTER — HOSPITAL ENCOUNTER (OUTPATIENT)
Dept: PHYSICAL THERAPY | Age: 62
Discharge: HOME | End: 2017-09-14
Payer: MEDICARE

## 2017-09-14 PROCEDURE — 97140 MANUAL THERAPY 1/> REGIONS: CPT | Performed by: PHYSICAL THERAPIST

## 2017-09-14 PROCEDURE — 97530 THERAPEUTIC ACTIVITIES: CPT | Performed by: PHYSICAL THERAPIST

## 2017-09-14 PROCEDURE — 97110 THERAPEUTIC EXERCISES: CPT | Performed by: PHYSICAL THERAPIST

## 2017-09-14 NOTE — PROGRESS NOTES
PT DAILY TREATMENT NOTE - [facility]     Patient Name: Amber Cross  Date:2017  : 1955  [x]  Patient  Verified  Payor: [place] MED[place]ARE / Plan: [place] MEDICARE PART A & B / Product Type: Medicare /    In time:9:00  Out time:10:23  Total Treatment Time (min): 83  Total Timed Codes (min): 83  1:1 Treatment Time ( [address] only): 54   Visit #:  of 16    Treatment Area: Right knee pain [M25.561]    SUBJECTIVE  Pain Level (0-10 scale): 2/10  Any medication changes, allergies to medications, adverse drug reactions, diagnosis change, or new procedure performed?: [x] No    [] Yes (see summary sheet for update)  Subjective functional status/changes:   [] No changes reported  \"I am doing alright today. \"    OBJECTIVE    Modality rationale: decrease edema, decrease inflammation and decrease pain to improve the patients ability to perform daily activities with decreased pain and symptom levels     Min Type Additional Details    [] Estim:  []Unatt       []IFC  []Premod                        []Other:  []w/ice   []w/heat  Position:  Location:    [] Estim: []Att    []TENS instruct  []NMES                    []Other:  []w/US   []w/ice   []w/heat  Position:  Location:    []  Traction: [] Cervical       []Lumbar                       [] Prone          []Supine                       []Intermittent   []Continuous Lbs:  [] before manual  [] after manual    []  Ultrasound: []Continuous   [] Pulsed                           []1MHz   []3MHz W/cm2:  Location:    []  Iontophoresis with dexamethasone         Location: [] Take home patch   [] In clinic    []  Ice     []  heat  []  Ice massage  []  Laser   []  Anodyne Position:  Location:    []  Laser with stim  []  Other:  Position:  Location:   10 [x]  Vasopneumatic Device Pressure:       [] lo [] med [] hi   Temperature: [] lo [] med [] hi   [] Skin assessment post-treatment:  []intact []redness- no adverse reaction    []redness  adverse reaction:     30 min Therapeutic Exercise:  [x] See flow sheet :   Rationale: increase ROM and increase strength to improve the patients ability to perform daily activities with decreased pain and symptom levels    17 min Therapeutic Activity:  [x]  See flow sheet :   Rationale: increase ROM and increase strength  to improve the patients ability to perform daily activities with decreased pain and symptom levels     8 min Manual Therapy:  PROM of right knee   Rationale: decrease pain and increase ROM to perform daily activities with decreased pain and symptom levels          With   [] TE   [] TA   [] neuro   [] other: Patient Education: [x] Review HEP    [x] Progressed/Changed HEP based on:   [] positioning   [] body mechanics   [] transfers   [] heat/ice application    [] other:      Other Objective/Functional Measures:   -Added TRX squats and bridges to exercise program to improve glute and HS strength. -FOTO: 46  -AROM: 0-126     Pain Level (0-10 scale) post treatment: 0/10    ASSESSMENT/Changes in Function: Patient demonstrates improvement in FOTO score and AROM of right knee. Patient will continue to benefit from skilled PT services to modify and progress therapeutic interventions, address functional mobility deficits, address ROM deficits, address strength deficits, analyze and address soft tissue restrictions and analyze and cue movement patterns to attain remaining goals. []  See Plan of Care  []  See progress note/recertification  []  See Discharge Summary         Progress towards goals / Updated goals:  Short Term Goals: STG- To be accomplished in 2 week(s):  1.  Pt will be independent with HEP to encourage prophylaxis. Eval: HEP dispensed  Last PN Partial compliance per pt report  Current:       Long Term Goals: LTG- To be accomplished in 8 week(s):  1.  Pt will demonstrate ability to ambulate with LRAD in the community for >1hour in order to return to prior level of function.   Eval: SPC in the community, no AD around home   Last PN: Still needing to use SPC in community however reports balance seems to be getting better - progressing  Current:      2.  Pt will increase right knee AROM to 0-120deg in order to participate in functional activities with less pain such as biking. Eval: 7-112  Last PN: 2-126- Progresing  Current:      3.  Pt right LE ROM will improve to equal that of left LE with to allow pt to return to ADLs with less pain. Eval:                                    Strength (1-5)        Left Right   Hip Flexion 5/5 4+/5      Extension 5/5 5/5      Abduction 5/5 4-/5 p!      Adduction 5/5 5/5   Knee Flexion 5/5 4/5      Extension 5/5 4/5 p!       Last PN:  PROGRESSING             Strength (1-5)        Left Right   Hip Flexion 5/5 4+/5      Extension 5/5 5/5      Abduction 5/5 4/5      Adduction 5/5 5/5   Knee Flexion 5/5 4/5      Extension 5/5 4/5     Current:      4.  Pt FOTO score will increase by >10 points to show improvement in mobility function.   Eval:43  Last PN: 40 - PROGRESSING  Current:        PLAN  [x]  Upgrade activities as tolerated     [x]  Continue plan of care  []  Update interventions per flow sheet       []  Discharge due to:_  []  Other:_      Jhonny La PT 9/14/2017  10:46 AM    Future Appointments  Date Time Provider Filemon Hopkins   9/19/2017 10:30 AM Gregory VAZQUEZ [facility]   9/21/2017 9:00 AM ALVA Carter THE [facility]   9/26/2017 10:30 AM Gregory VAZQUEZ THE [facility]   9/28/2017 9:30 AM ALVA Carter THE [facility]

## 2017-09-19 ENCOUNTER — HOSPITAL ENCOUNTER (OUTPATIENT)
Dept: PHYSICAL THERAPY | Age: 62
Discharge: HOME | End: 2017-09-19
Payer: MEDICARE

## 2017-09-19 PROCEDURE — 97110 THERAPEUTIC EXERCISES: CPT

## 2017-09-19 PROCEDURE — 97140 MANUAL THERAPY 1/> REGIONS: CPT

## 2017-09-19 NOTE — PROGRESS NOTES
PT DAILY TREATMENT NOTE - [facility]     Patient Name: Day Reyes  Date:2017  : 1955  [x]  Patient  Verified  Payor: Brittani Patel / Plan: [place] MEDICARE PART A & B / Product Type: Medicare /    In time:10:30  Out time:12:28  Total Treatment Time (min): 118  Total Timed Codes (min): 103  1:1 Treatment Time ( [address] only): 40   Visit #: 12 of 16    Treatment Area: Right knee pain [M25.561]    SUBJECTIVE  Pain Level (0-10 scale): 2  Any medication changes, allergies to medications, adverse drug reactions, diagnosis change, or new procedure performed?: [x] No    [] Yes (see summary sheet for update)  Subjective functional status/changes:   [] No changes reported  \"I'm getting better. Stairs are still hard. \"    OBJECTIVE    Modality rationale: decrease inflammation, decrease pain and increase tissue extensibility to improve the patients ability to tolerate daily activities.     Min Type Additional Details    [] Estim:  []Unatt       []IFC  []Premod                        []Other:  []w/ice   []w/heat  Position:  Location:    [] Estim: []Att    []TENS instruct  []NMES                    []Other:  []w/US   []w/ice   []w/heat  Position:  Location:    []  Traction: [] Cervical       []Lumbar                       [] Prone          []Supine                       []Intermittent   []Continuous Lbs:  [] before manual  [] after manual    []  Ultrasound: []Continuous   [] Pulsed                           []1MHz   []3MHz W/cm2:  Location:    []  Iontophoresis with dexamethasone         Location: [] Take home patch   [] In clinic    []  Ice     []  heat  []  Ice massage  []  Laser   []  Anodyne Position:  Location:    []  Laser with stim  []  Other:  Position:  Location:   10 [x]  Vasopneumatic Device Pressure:       [] lo [x] med [] hi   Temperature: [x] lo [] med [] hi   [x] Skin assessment post-treatment:  [x]intact []redness- no adverse reaction    []redness  adverse reaction:     74 min Therapeutic Exercise:  [x] See flow sheet :   Rationale: increase ROM, increase strength and improve coordination to improve the patients ability to complete daily activities with decreased pain and symptom levels. 22 min Therapeutic Activity:  [x]  See flow sheet :   Rationale: increase ROM, increase strength, improve coordination, improve balance and increase proprioception  to improve the patients ability to complet daily activities with decreased pain and symptom levels. 12 min Manual Therapy:  PROM right knee in supine, patella mobs med/lat/inf in supine and inf in knee flexion, STM to lateral/medial gastroc and HS   Rationale: decrease pain, increase ROM and increase tissue extensibility to complete daily activities with decreased pain and symptom levels. With   [] TE   [] TA   [] neuro   [] other: Patient Education: [x] Review HEP    [] Progressed/Changed HEP based on:   [] positioning   [] body mechanics   [] transfers   [] heat/ice application    [] other:      Other Objective/Functional Measures: 0-126\" AROM     Pain Level (0-10 scale) post treatment: 0    ASSESSMENT/Changes in Function: Pt continues to demonstrate HS and glut weakness with bridges and standing 3 way hip. Pt reports stairs are still a challenge but getting easier. Patient will continue to benefit from skilled PT services to modify and progress therapeutic interventions, address functional mobility deficits, address ROM deficits, address strength deficits, analyze and address soft tissue restrictions, analyze and modify body mechanics/ergonomics and instruct in home and community integration to attain remaining goals. []  See Plan of Care  []  See progress note/recertification  []  See Discharge Summary         Progress towards goals / Updated goals:  Short Term Goals: STG- To be accomplished in 2 week(s):  1.  Pt will be independent with HEP to encourage prophylaxis.   Eval: HEP dispensed  Last PN Partial compliance per pt report  Current: Partial compliance      Long Term Goals: LTG- To be accomplished in 8 week(s):  1.  Pt will demonstrate ability to ambulate with LRAD in the community for >1hour in order to return to prior level of function. Eval: SPC in the community, no AD around home   Last PN: Still needing to use SPC in community however reports balance seems to be getting better - progressing  Current:      2.  Pt will increase right knee AROM to 0-120deg in order to participate in functional activities with less pain such as biking. Eval: 7-112  Last PN: 2-126- Progresing  Current:      3.  Pt right LE ROM will improve to equal that of left LE with to allow pt to return to ADLs with less pain. Eval:                                    Strength (1-5)        Left Right   Hip Flexion 5/5 4+/5      Extension 5/5 5/5      Abduction 5/5 4-/5 p!      Adduction 5/5 5/5   Knee Flexion 5/5 4/5      Extension 5/5 4/5 p!       Last PN:  PROGRESSING             Strength (1-5)        Left Right   Hip Flexion 5/5 4+/5      Extension 5/5 5/5      Abduction 5/5 4/5      Adduction 5/5 5/5   Knee Flexion 5/5 4/5      Extension 5/5 4/5     Current:      4.  Pt FOTO score will increase by >10 points to show improvement in mobility function.   Eval:43  Last PN: 40 - PROGRESSING  Current:        PLAN  [x]  Upgrade activities as tolerated     []  Continue plan of care  []  Update interventions per flow sheet       []  Discharge due to:_  []  Other:_      Lesli Michelle 9/19/2017  10:48 AM    Future Appointments  Date Time Provider Filemon Hopkins   9/21/2017 9:00 AM ALVA Watson THE [facility]   9/26/2017 12:30 PM Lesli VAZQUEZ THE [facility]   9/28/2017 9:30 AM ALVA Watson THE [facility]

## 2017-09-21 ENCOUNTER — HOSPITAL ENCOUNTER (OUTPATIENT)
Dept: PHYSICAL THERAPY | Age: 62
Discharge: HOME | End: 2017-09-21
Payer: MEDICARE

## 2017-09-21 PROCEDURE — 97140 MANUAL THERAPY 1/> REGIONS: CPT | Performed by: PHYSICAL THERAPIST

## 2017-09-21 PROCEDURE — 97530 THERAPEUTIC ACTIVITIES: CPT | Performed by: PHYSICAL THERAPIST

## 2017-09-21 PROCEDURE — 97110 THERAPEUTIC EXERCISES: CPT | Performed by: PHYSICAL THERAPIST

## 2017-09-21 NOTE — PROGRESS NOTES
PT DAILY TREATMENT NOTE - [facility]     Patient Name: Enrique Medina  Date:2017  : 1955  [x]  Patient  Verified  Payor: [place] MEDICARE / Plan: [place] MEDICARE PART A & B / Product Type: Medicare /    In time:9:00  Out time:10:17  Total Treatment Time (min): 77  Total Timed Codes (min): 77  1:1 Treatment Time ( [address] only): 68   Visit #: 13 of 16    Treatment Area: Right knee pain [M25.561]    SUBJECTIVE  Pain Level (0-10 scale): 4/10  Any medication changes, allergies to medications, adverse drug reactions, diagnosis change, or new procedure performed?: [x] No    [] Yes (see summary sheet for update)  Subjective functional status/changes:   [] No changes reported  \"I am having a little bit more pain today. \"    OBJECTIVE      45 min Therapeutic Exercise:  [x] See flow sheet :   Rationale: increase ROM and increase strength to improve the patients ability to perform daily activities with decreased pain and symptom levels      20 min Therapeutic Activity:  [x]  See flow sheet :   Rationale: increase ROM and increase strength  to improve the patients ability to perform daily activities with decreased pain and symptom levels         12 min Manual Therapy:  PROM to right knee, STM to quads and hamstrings. Rationale: decrease pain, increase ROM and increase tissue extensibility to perform daily activities with decreased pain and symptom levels            With   [] TE   [] TA   [] neuro   [] other: Patient Education: [x] Review HEP    [x] Progressed/Changed HEP based on:   [] positioning   [] body mechanics   [] transfers   [] heat/ice application    [] other:      Other Objective/Functional Measures:   Patient with pain at medial and lateral joint line      Pain Level (0-10 scale) post treatment: 3/10    ASSESSMENT/Changes in Function: Patient demonstrates improvement in hip stability during hip 3 way.      Patient will continue to benefit from skilled PT services to modify and progress therapeutic interventions, address functional mobility deficits, address ROM deficits, address strength deficits, analyze and address soft tissue restrictions, analyze and cue movement patterns and analyze and modify body mechanics/ergonomics to attain remaining goals. []  See Plan of Care  []  See progress note/recertification  []  See Discharge Summary         Progress towards goals / Updated goals:  Short Term Goals: STG- To be accomplished in 2 week(s):  1.  Pt will be independent with HEP to encourage prophylaxis. Eval: HEP dispensed  Last PN Partial compliance per pt report  Current: Partial compliance      Long Term Goals: LTG- To be accomplished in 8 week(s):  1.  Pt will demonstrate ability to ambulate with LRAD in the community for >1hour in order to return to prior level of function. Eval: SPC in the community, no AD around home   Last PN: Still needing to use SPC in community however reports balance seems to be getting better - progressing  Current:      2.  Pt will increase right knee AROM to 0-120deg in order to participate in functional activities with less pain such as biking. Eval: 7-112  Last PN: 2-126- Progresing  Current:      3.  Pt right LE ROM will improve to equal that of left LE with to allow pt to return to ADLs with less pain. Eval:                                    Strength (1-5)        Left Right   Hip Flexion 5/5 4+/5      Extension 5/5 5/5      Abduction 5/5 4-/5 p!      Adduction 5/5 5/5   Knee Flexion 5/5 4/5      Extension 5/5 4/5 p!       Last PN:  PROGRESSING             Strength (1-5)        Left Right   Hip Flexion 5/5 4+/5      Extension 5/5 5/5      Abduction 5/5 4/5      Adduction 5/5 5/5   Knee Flexion 5/5 4/5      Extension 5/5 4/5     Current:      4.  Pt FOTO score will increase by >10 points to show improvement in mobility function.   Eval:43  Last PN: 44 - PROGRESSING  Current:       PLAN  [x]  Upgrade activities as tolerated     [x]  Continue plan of care  []  Update interventions per flow sheet       []  Discharge due to:_  []  Other:_      Sarahi Rogers, ALVA 9/21/2017  10:53 AM    Future Appointments  Date Time Provider Filemon Hopkins   9/26/2017 12:30 PM Jatin VAZQUEZ THE [facility]   9/28/2017 9:30 AM ALVA Ambrocio THE [facility]

## 2017-09-26 ENCOUNTER — HOSPITAL ENCOUNTER (OUTPATIENT)
Dept: PHYSICAL THERAPY | Age: 62
Discharge: HOME | End: 2017-09-26
Payer: MEDICARE

## 2017-09-26 PROCEDURE — 97110 THERAPEUTIC EXERCISES: CPT

## 2017-09-26 PROCEDURE — 97140 MANUAL THERAPY 1/> REGIONS: CPT

## 2017-09-26 NOTE — PROGRESS NOTES
PT DAILY TREATMENT NOTE - [facility]     Patient Name: Aisha Freed  Date:2017  : 1955  [x]  Patient  Verified  Payor: VA MEDICARE / Plan: VA MEDICARE PART A & B / Product Type: Medicare /    In time:12:20  Out time:1:35  Total Treatment Time (min): 75  Total Timed Codes (min): 75  1:1 Treatment Time ( [address] only): 40   Visit #: 14 of 16    Treatment Area: Right knee pain [M25.561]    SUBJECTIVE  Pain Level (0-10 scale): 3  Any medication changes, allergies to medications, adverse drug reactions, diagnosis change, or new procedure performed?: [x] No    [] Yes (see summary sheet for update)  Subjective functional status/changes:   [] No changes reported  \"I'm doing alright. Still some pain in the front of the knee. \"    OBJECTIVE      52 min Therapeutic Exercise:  [x] See flow sheet :   Rationale: increase ROM, increase strength and improve coordination to improve the patients ability to complete daily activities with decreased pain and symptom levels. 15 min Therapeutic Activity:  [x]  See flow sheet :   Rationale: increase ROM, increase strength, improve coordination, improve balance and increase proprioception  to improve the patients ability to complete daily activities with decreased pain and symptom levels. 8 min Manual Therapy:  PROM right knee with overpressure into extension, STM to medial and lateral distal HS in supine, lateral gastroc   Rationale: decrease pain, increase ROM, increase tissue extensibility and decrease trigger points to complete daily activities with decreased pain and symptom levels.            With   [] TE   [] TA   [] neuro   [] other: Patient Education: [x] Review HEP    [] Progressed/Changed HEP based on:   [] positioning   [] body mechanics   [] transfers   [] heat/ice application    [] other:      Other Objective/Functional Measures: 1-126* knee AROM     Pain Level (0-10 scale) post treatment: 1    ASSESSMENT/Changes in Function: Added hurdles, SL balance, and leg press today with no reports of increased pain. Verbal cues needed to correct abduction with hurdles. Decreased balance with SL due to frequent corrections for LOB. Continues to demonstrate tightness and trigger points in HS. Patient will continue to benefit from skilled PT services to modify and progress therapeutic interventions, address functional mobility deficits, address ROM deficits, address strength deficits, analyze and address soft tissue restrictions, analyze and modify body mechanics/ergonomics, address imbalance/dizziness and instruct in home and community integration to attain remaining goals. []  See Plan of Care  []  See progress note/recertification  []  See Discharge Summary         Progress towards goals / Updated goals:  Short Term Goals: STG- To be accomplished in 2 week(s):  1.  Pt will be independent with HEP to encourage prophylaxis. Eval: HEP dispensed  Last PN Partial compliance per pt report  Current: Partial compliance      Long Term Goals: LTG- To be accomplished in 8 week(s):  1.  Pt will demonstrate ability to ambulate with LRAD in the community for >1hour in order to return to prior level of function. Eval: SPC in the community, no AD around home   Last PN: Still needing to use SPC in community however reports balance seems to be getting better - progressing  Current:      2.  Pt will increase right knee AROM to 0-120deg in order to participate in functional activities with less pain such as biking. Eval: 7-112  Last PN: 2-126- Progresing  Current:1-126* - progressing      3.  Pt right LE ROM will improve to equal that of left LE with to allow pt to return to ADLs with less pain.   Eval:                                    Strength (1-5)        Left Right   Hip Flexion 5/5 4+/5      Extension 5/5 5/5      Abduction 5/5 4-/5 p!      Adduction 5/5 5/5   Knee Flexion 5/5 4/5      Extension 5/5 4/5 p!       Last PN:  PROGRESSING             Strength (1-5)        Left Right Hip Flexion 5/5 4+/5      Extension 5/5 5/5      Abduction 5/5 4/5      Adduction 5/5 5/5   Knee Flexion 5/5 4/5      Extension 5/5 4/5     Current:      4.  Pt FOTO score will increase by >10 points to show improvement in mobility function.   Eval:43  Last PN: 40 - PROGRESSING  Current:       PLAN  []  Upgrade activities as tolerated     []  Continue plan of care  []  Update interventions per flow sheet       []  Discharge due to:_  []  Other:_      [facility] 9/26/2017  12:47 PM    Future Appointments  Date Time Provider Filemon Hopkins   9/28/2017 9:30 AM Garcia Bush, PT [id number]

## 2017-09-28 ENCOUNTER — HOSPITAL ENCOUNTER (OUTPATIENT)
Dept: PHYSICAL THERAPY | Age: 62
Discharge: HOME | End: 2017-09-28
Payer: MEDICARE

## 2017-09-28 PROCEDURE — 97530 THERAPEUTIC ACTIVITIES: CPT | Performed by: PHYSICAL THERAPIST

## 2017-09-28 PROCEDURE — 97110 THERAPEUTIC EXERCISES: CPT | Performed by: PHYSICAL THERAPIST

## 2017-09-28 NOTE — PROGRESS NOTES
PT DAILY TREATMENT NOTE - [facility]     Patient Name: Dipesh Meyers  Date:2017  : 1955  [x]  Patient  Verified  Payor: [place] MED[place] / Plan: [place] MED[place] & B / Product Type: Medicare /    In time:9:30  Out time:10:57  Total Treatment Time (min): 87  Total Timed Codes (min): 87  1:1 Treatment Time ( [address] only): 40   Visit #: 15 of 16    Treatment Area: Right knee pain [M25.561]    SUBJECTIVE  Pain Level (0-10 scale): 5/10  Any medication changes, allergies to medications, adverse drug reactions, diagnosis change, or new procedure performed?: [x] No    [] Yes (see summary sheet for update)  Subjective functional status/changes:   [] No changes reported  \"I am a little more sore today but I am alright with having next session be my last appointment. \"    OBJECTIVE    Modality rationale: decrease edema, decrease inflammation and decrease pain to improve the patients ability to perform daily activities with decreased pain and symptom levels     Min Type Additional Details    [] Estim:  []Unatt       []IFC  []Premod                        []Other:  []w/ice   []w/heat  Position:  Location:    [] Estim: []Att    []TENS instruct  []NMES                    []Other:  []w/US   []w/ice   []w/heat  Position:  Location:    []  Traction: [] Cervical       []Lumbar                       [] Prone          []Supine                       []Intermittent   []Continuous Lbs:  [] before manual  [] after manual    []  Ultrasound: []Continuous   [] Pulsed                           []1MHz   []3MHz W/cm2:  Location:    []  Iontophoresis with dexamethasone         Location: [] Take home patch   [] In clinic    []  Ice     []  heat  []  Ice massage  []  Laser   []  Anodyne Position:  Location:    []  Laser with stim  []  Other:  Position:  Location:    []  Vasopneumatic Device Pressure:       [] lo [] med [] hi   Temperature: [] lo [] med [] hi   [] Skin assessment post-treatment:  []intact []redness- no adverse reaction []redness  adverse reaction:     50 min Therapeutic Exercise:  [x] See flow sheet :   Rationale: increase ROM and increase strength to improve the patients ability to perform daily activities with decreased pain and symptom levels      27 min Therapeutic Activity:  [x]  See flow sheet :   Rationale: increase ROM and increase strength  to improve the patients ability to perform daily activities with decreased pain and symptom levels               With   [] TE   [] TA   [] neuro   [] other: Patient Education: [x] Review HEP    [] Progressed/Changed HEP based on:   [] positioning   [] body mechanics   [] transfers   [] heat/ice application    [] other:      Other Objective/Functional Measures:   -Patient demonstrates difficulty clearing hurdles and compensates with hip lift. Pain Level (0-10 scale) post treatment: 3/10    ASSESSMENT/Changes in Function: Patient is independent with current exercise program and has reached maximum rehab potential at this time. Patient is able continue HEP independently to further strengthen. Plan to discharge next visit. Patient will continue to benefit from skilled PT services to modify and progress therapeutic interventions, address functional mobility deficits, address ROM deficits, address strength deficits, analyze and address soft tissue restrictions, analyze and cue movement patterns and analyze and modify body mechanics/ergonomics to attain remaining goals. []  See Plan of Care  []  See progress note/recertification  []  See Discharge Summary         Progress towards goals / Updated goals:  Short Term Goals: STG- To be accomplished in 2 week(s):  1.  Pt will be independent with HEP to encourage prophylaxis.   Eval: HEP dispensed  Last PN Partial compliance per pt report  Current: Partial compliance      Long Term Goals: LTG- To be accomplished in 8 week(s):  1.  Pt will demonstrate ability to ambulate with LRAD in the community for >1hour in order to return to prior level of function. Eval: SPC in the community, no AD around home   Last PN: Still needing to use SPC in community however reports balance seems to be getting better - progressing  Current:      2.  Pt will increase right knee AROM to 0-120deg in order to participate in functional activities with less pain such as biking. Eval: 7-112  Last PN: 2-126- Progresing  Current:1-126* - progressing      3.  Pt right LE ROM will improve to equal that of left LE with to allow pt to return to ADLs with less pain. Eval:                                    Strength (1-5)        Left Right   Hip Flexion 5/5 4+/5      Extension 5/5 5/5      Abduction 5/5 4-/5 p!      Adduction 5/5 5/5   Knee Flexion 5/5 4/5      Extension 5/5 4/5 p!       Last PN:  PROGRESSING             Strength (1-5)        Left Right   Hip Flexion 5/5 4+/5      Extension 5/5 5/5      Abduction 5/5 4/5      Adduction 5/5 5/5   Knee Flexion 5/5 4/5      Extension 5/5 4/5     Current:      4.  Pt FOTO score will increase by >10 points to show improvement in mobility function. Eval:43  Last PN: 40 - PROGRESSING  Current:       PLAN  [x]  Upgrade activities as tolerated     [x]  Continue plan of care  []  Update interventions per flow sheet       []  Discharge due to:_  []  Other:_      Cory Small, PT 9/28/2017  9:52 AM    No future appointments.

## 2017-10-05 ENCOUNTER — HOSPITAL ENCOUNTER (OUTPATIENT)
Dept: PHYSICAL THERAPY | Age: 62
Discharge: HOME | End: 2017-10-05
Payer: MEDICARE

## 2017-10-05 PROCEDURE — G8979 MOBILITY GOAL STATUS: HCPCS | Performed by: PHYSICAL THERAPIST

## 2017-10-05 PROCEDURE — 97530 THERAPEUTIC ACTIVITIES: CPT | Performed by: PHYSICAL THERAPIST

## 2017-10-05 PROCEDURE — G8980 MOBILITY D/C STATUS: HCPCS | Performed by: PHYSICAL THERAPIST

## 2017-10-05 NOTE — PROGRESS NOTES
PT DISCHARGE DAILY NOTE AND [id number]    Date:10/5/2017  Patient name: Azeb Smith Start of Care: 2017   Referral source: Eleazar Mendoza MD : 1955                         Medical Diagnosis: Right knee pain [M25.561] Onset Date:17                         Treatment Diagnosis: right knee pain   Prior Hospitalization: see medical history Provider#: [id number]   Medications: Verified on Patient summary List    Comorbidities: arthritis, back pain, HA, high BP,osteoporesis, sleep dysfunction   Prior Level of Function: indep with ADLs without pain    Visits from Start of Care: 16    Missed Visits: 0    Reporting Period : 17 to 10/5/17    [x]  Patient  Verified  Payor: [employer] / Plan: [place] MEDICARE PART A & B / Product Type: Medicare /    In time:10:30  Out time:11:45  Total Treatment Time (min): 75  Total Timed Codes (min): 65  1:1 Treatment Time ( [address] only): 13   Visit #: 16 of 16    SUBJECTIVE  Pain Level (0-10 scale): 2/10  Any medication changes, allergies to medications, adverse drug reactions, diagnosis change, or new procedure performed?: [x] No    [] Yes (see summary sheet for update)  Subjective functional status/changes:   [] No changes reported  \"I think I can handle all the exercises on my own. \"    OBJECTIVE  Modality rationale: decrease edema, decrease inflammation and decrease pain to improve the patients ability to perform daily activities with decreased pain and symptom levels      Min Type Additional Details     [] Estim:  []Unatt       []IFC  []Premod                        []Other:  []w/ice   []w/heat  Position:  Location:     [] Estim: []Att    []TENS instruct  []NMES                    []Other:  []w/US   []w/ice   []w/heat  Position:  Location:     []  Traction: [] Cervical       []Lumbar                       [] Prone          []Supine                       []Intermittent   []Continuous Lbs:  [] before manual  [] after manual     []  Ultrasound: []Continuous   [] Pulsed                           []1MHz   []3MHz W/cm2:  Location:     []  Iontophoresis with dexamethasone         Location: [] Take home patch   [] In clinic     []  Ice     []  heat  []  Ice massage  []  Laser   []  Anodyne Position:  Location:     []  Laser with stim  []  Other:  Position:  Location:   10  [x]  Vasopneumatic Device Pressure:       [] lo [] med [] hi   Temperature: [] lo [] med [] hi   [] Skin assessment post-treatment:  []intact []redness- no adverse reaction    []redness  adverse reaction:      50 min Therapeutic Exercise:  [x] See flow sheet :   Rationale: increase ROM and increase strength to improve the patients ability to perform daily activities with decreased pain and symptom levels        15 min Therapeutic Activity:  [x]  See flow sheet :   Rationale: increase ROM and increase strength  to improve the patients ability to perform daily activities with decreased pain and symptom levels             With   [x] TE   [] TA   [] neuro   [] other: Patient Education: [x] Review HEP    [] Progressed/Changed HEP based on:   [] positioning   [x] body mechanics   [] transfers   [] heat/ice application    [] other:         Other Objective/Functional Measures: Issued HEP with theraband. Reviewed HEP and frequency of exercises. Patient verbalizes understanding. Pain Level (0-10 scale) post treatment: 0/10    Summary of Care:  Short Term Goals: STG- To be accomplished in 2 week(s):  1.  Pt will be independent with HEP to encourage prophylaxis. Eval: HEP dispensed  Last PN Partial compliance per pt report  Current: Patient independent with current program.       Long Term Goals: LTG- To be accomplished in 8 week(s):  1.  Pt will demonstrate ability to ambulate with LRAD in the community for >1hour in order to return to prior level of function.   Eval: SPC in the community, no AD around home   Last PN: Still needing to use SPC in community however reports balance seems to be getting better - progressing  Current:Patient continues to use [facility] for community distances.       2.  Pt will increase right knee AROM to 0-120deg in order to participate in functional activities with less pain such as biking. Eval: 7-112  Last PN: 2-126- Progresing  Current:0-126* - MET      3.  Pt right LE ROM will improve to equal that of left LE with to allow pt to return to ADLs with less pain. Eval:                                    Strength (1-5)        Left Right   Hip Flexion 5/5 4+/5      Extension 5/5 5/5      Abduction 5/5 4-/5 p!      Adduction 5/5 5/5   Knee Flexion 5/5 4/5      Extension 5/5 4/5 p!       Last PN:  PROGRESSING             Strength (1-5)        Left Right   Hip Flexion 5/5 4+/5      Extension 5/5 5/5      Abduction 5/5 4/5      Adduction 5/5 5/5   Knee Flexion 5/5 4/5      Extension 5/5 4/5     Current:      4.  Pt FOTO score will increase by >10 points to show improvement in mobility function. Eval:43  Last PN: 44 - PROGRESSING  Current: 39- NOT MET    ASSESSMENT/Changes in Function: Patient is a 58 y/o male who presented to outpatient PT s/p right TKR on 7/11/17 and has attended 16 PT sessions to focus on improving ROM, mobility and strength. Patient met 75% of goals and will be discharged to [facility]. [place] [facility] [place]. G-Codes (GP)  Mobility   [id number] Goal  CK= 40-59%  D/C  CK= 40-59%    The severity rating is based on clinical judgment and the FOTO score.       Thank you for this referral!     PLAN  [x]Discontinue therapy: []Patient has reached or is progressing toward set goals      []Patient is non-compliant or has abdicated      []Due to lack of appreciable progress towards set goals    Yany Amador, PT 10/5/2017  1:19 PM